# Patient Record
Sex: FEMALE | Race: WHITE | NOT HISPANIC OR LATINO | Employment: OTHER | URBAN - NONMETROPOLITAN AREA
[De-identification: names, ages, dates, MRNs, and addresses within clinical notes are randomized per-mention and may not be internally consistent; named-entity substitution may affect disease eponyms.]

---

## 2017-01-12 RX ORDER — LEVOTHYROXINE SODIUM 0.07 MG/1
TABLET ORAL
Qty: 30 TABLET | Refills: 4 | Status: SHIPPED | OUTPATIENT
Start: 2017-01-12 | End: 2017-05-26 | Stop reason: SDUPTHER

## 2017-01-12 RX ORDER — ATORVASTATIN CALCIUM 10 MG/1
TABLET, FILM COATED ORAL
Qty: 90 TABLET | Refills: 0 | Status: SHIPPED | OUTPATIENT
Start: 2017-01-12 | End: 2017-05-26 | Stop reason: SDUPTHER

## 2017-01-12 RX ORDER — MEGESTROL ACETATE 40 MG/1
TABLET ORAL
Qty: 30 TABLET | Refills: 5 | Status: SHIPPED | OUTPATIENT
Start: 2017-01-12 | End: 2017-05-26

## 2017-01-12 RX ORDER — LISINOPRIL AND HYDROCHLOROTHIAZIDE 25; 20 MG/1; MG/1
TABLET ORAL
Qty: 90 TABLET | Refills: 0 | Status: SHIPPED | OUTPATIENT
Start: 2017-01-12 | End: 2017-05-26 | Stop reason: SDUPTHER

## 2017-01-26 ENCOUNTER — TRANSCRIBE ORDERS (OUTPATIENT)
Dept: GENERAL RADIOLOGY | Facility: CLINIC | Age: 52
End: 2017-01-26

## 2017-01-26 DIAGNOSIS — M79.672 LEFT FOOT PAIN: ICD-10-CM

## 2017-01-26 DIAGNOSIS — M25.572 LEFT ANKLE PAIN, UNSPECIFIED CHRONICITY: ICD-10-CM

## 2017-01-26 DIAGNOSIS — M25.562 LEFT KNEE PAIN, UNSPECIFIED CHRONICITY: ICD-10-CM

## 2017-01-26 DIAGNOSIS — M25.561 RIGHT KNEE PAIN, UNSPECIFIED CHRONICITY: Primary | ICD-10-CM

## 2017-05-26 ENCOUNTER — OFFICE VISIT (OUTPATIENT)
Dept: FAMILY MEDICINE CLINIC | Facility: CLINIC | Age: 52
End: 2017-05-26

## 2017-05-26 VITALS
OXYGEN SATURATION: 100 % | TEMPERATURE: 98.6 F | HEIGHT: 66 IN | HEART RATE: 89 BPM | SYSTOLIC BLOOD PRESSURE: 136 MMHG | DIASTOLIC BLOOD PRESSURE: 74 MMHG | BODY MASS INDEX: 47.09 KG/M2 | WEIGHT: 293 LBS

## 2017-05-26 DIAGNOSIS — E78.5 HYPERLIPIDEMIA, UNSPECIFIED: Chronic | ICD-10-CM

## 2017-05-26 DIAGNOSIS — I10 ESSENTIAL HYPERTENSION: Chronic | ICD-10-CM

## 2017-05-26 DIAGNOSIS — F32.A DEPRESSION, UNSPECIFIED DEPRESSION TYPE: ICD-10-CM

## 2017-05-26 DIAGNOSIS — Z13.9 SCREENING: Primary | ICD-10-CM

## 2017-05-26 DIAGNOSIS — E03.9 HYPOTHYROIDISM, UNSPECIFIED: Chronic | ICD-10-CM

## 2017-05-26 DIAGNOSIS — E66.01 MORBID OBESITY DUE TO EXCESS CALORIES (HCC): Chronic | ICD-10-CM

## 2017-05-26 PROCEDURE — 99214 OFFICE O/P EST MOD 30 MIN: CPT | Performed by: INTERNAL MEDICINE

## 2017-05-26 RX ORDER — MEGESTROL ACETATE 40 MG/1
40 TABLET ORAL DAILY
COMMUNITY
End: 2017-05-26

## 2017-05-26 RX ORDER — ATORVASTATIN CALCIUM 10 MG/1
10 TABLET, FILM COATED ORAL DAILY
Qty: 90 TABLET | Refills: 1 | Status: SHIPPED | OUTPATIENT
Start: 2017-05-26 | End: 2017-12-21 | Stop reason: SDUPTHER

## 2017-05-26 RX ORDER — LISINOPRIL AND HYDROCHLOROTHIAZIDE 25; 20 MG/1; MG/1
TABLET ORAL
Qty: 90 TABLET | Refills: 1 | Status: SHIPPED | OUTPATIENT
Start: 2017-05-26 | End: 2017-12-21 | Stop reason: SDUPTHER

## 2017-05-26 RX ORDER — OXYCODONE HCL 10 MG/1
10 TABLET, FILM COATED, EXTENDED RELEASE ORAL 3 TIMES DAILY PRN
COMMUNITY
End: 2017-11-20 | Stop reason: SDUPTHER

## 2017-05-26 RX ORDER — FUROSEMIDE 20 MG/1
20 TABLET ORAL DAILY
Qty: 30 TABLET | Refills: 5 | Status: SHIPPED | OUTPATIENT
Start: 2017-05-26 | End: 2017-11-20

## 2017-05-26 RX ORDER — DULOXETIN HYDROCHLORIDE 30 MG/1
30 CAPSULE, DELAYED RELEASE ORAL DAILY
Qty: 30 CAPSULE | Refills: 5 | Status: SHIPPED | OUTPATIENT
Start: 2017-05-26 | End: 2017-11-20

## 2017-05-26 RX ORDER — METHADONE HYDROCHLORIDE 10 MG/1
10 TABLET ORAL EVERY 6 HOURS PRN
COMMUNITY
End: 2017-11-20

## 2017-05-26 RX ORDER — LEVOTHYROXINE SODIUM 0.07 MG/1
75 TABLET ORAL DAILY
Qty: 90 TABLET | Refills: 5 | Status: SHIPPED | OUTPATIENT
Start: 2017-05-26 | End: 2017-12-21 | Stop reason: SDUPTHER

## 2017-07-24 ENCOUNTER — TRANSCRIBE ORDERS (OUTPATIENT)
Dept: LAB | Facility: OTHER | Age: 52
End: 2017-07-24

## 2017-07-24 ENCOUNTER — TRANSCRIBE ORDERS (OUTPATIENT)
Dept: GENERAL RADIOLOGY | Facility: CLINIC | Age: 52
End: 2017-07-24

## 2017-07-24 ENCOUNTER — TRANSCRIBE ORDERS (OUTPATIENT)
Dept: CARDIOLOGY | Facility: CLINIC | Age: 52
End: 2017-07-24

## 2017-07-24 DIAGNOSIS — Z79.891 LONG TERM CURRENT USE OF OPIATE ANALGESIC: Primary | ICD-10-CM

## 2017-07-24 DIAGNOSIS — G89.29 OTHER CHRONIC PAIN: Primary | ICD-10-CM

## 2017-07-24 DIAGNOSIS — Z79.891 LONG TERM CURRENT USE OF METHADONE FOR PAIN CONTROL: Primary | ICD-10-CM

## 2017-07-24 PROCEDURE — 93005 ELECTROCARDIOGRAM TRACING: CPT | Performed by: INTERNAL MEDICINE

## 2017-09-08 RX ORDER — MEGESTROL ACETATE 40 MG/1
TABLET ORAL
Qty: 30 TABLET | Refills: 6 | Status: SHIPPED | OUTPATIENT
Start: 2017-09-08 | End: 2017-10-27 | Stop reason: SDUPTHER

## 2017-09-21 ENCOUNTER — LAB (OUTPATIENT)
Dept: LAB | Facility: OTHER | Age: 52
End: 2017-09-21

## 2017-09-21 DIAGNOSIS — Z13.9 SCREENING: ICD-10-CM

## 2017-09-21 LAB
ALBUMIN SERPL-MCNC: 3.8 G/DL (ref 3.2–5.5)
ALBUMIN/GLOB SERPL: 1.1 G/DL (ref 1–3)
ALP SERPL-CCNC: 80 U/L (ref 15–121)
ALT SERPL W P-5'-P-CCNC: 23 U/L (ref 10–60)
ANION GAP SERPL CALCULATED.3IONS-SCNC: 12 MMOL/L (ref 5–15)
AST SERPL-CCNC: 23 U/L (ref 10–60)
BASOPHILS # BLD AUTO: 0.03 10*3/MM3 (ref 0–0.2)
BASOPHILS NFR BLD AUTO: 0.2 % (ref 0–2)
BILIRUB SERPL-MCNC: 0.6 MG/DL (ref 0.2–1)
BUN BLD-MCNC: 18 MG/DL (ref 8–25)
BUN/CREAT SERPL: 25.7 (ref 7–25)
CALCIUM SPEC-SCNC: 9.3 MG/DL (ref 8.4–10.8)
CHLORIDE SERPL-SCNC: 104 MMOL/L (ref 100–112)
CHOLEST SERPL-MCNC: 159 MG/DL (ref 150–200)
CO2 SERPL-SCNC: 24 MMOL/L (ref 20–32)
CREAT BLD-MCNC: 0.7 MG/DL (ref 0.4–1.3)
DEPRECATED RDW RBC AUTO: 46.8 FL (ref 36.4–46.3)
EOSINOPHIL # BLD AUTO: 0.22 10*3/MM3 (ref 0–0.7)
EOSINOPHIL NFR BLD AUTO: 1.7 % (ref 0–7)
ERYTHROCYTE [DISTWIDTH] IN BLOOD BY AUTOMATED COUNT: 13.2 % (ref 11.5–14.5)
GFR SERPL CREATININE-BSD FRML MDRD: 88 ML/MIN/1.73 (ref 51–120)
GLOBULIN UR ELPH-MCNC: 3.6 GM/DL (ref 2.5–4.6)
GLUCOSE BLD-MCNC: 110 MG/DL (ref 70–100)
HCT VFR BLD AUTO: 42.5 % (ref 35–45)
HDLC SERPL-MCNC: 37 MG/DL (ref 35–100)
HGB BLD-MCNC: 13.6 G/DL (ref 12–15.5)
LDLC SERPL CALC-MCNC: 110 MG/DL
LDLC/HDLC SERPL: 2.98 {RATIO}
LYMPHOCYTES # BLD AUTO: 3.16 10*3/MM3 (ref 0.6–4.2)
LYMPHOCYTES NFR BLD AUTO: 24.2 % (ref 10–50)
MCH RBC QN AUTO: 31.5 PG (ref 26.5–34)
MCHC RBC AUTO-ENTMCNC: 32 G/DL (ref 31.4–36)
MCV RBC AUTO: 98.4 FL (ref 80–98)
MONOCYTES # BLD AUTO: 1.04 10*3/MM3 (ref 0–0.9)
MONOCYTES NFR BLD AUTO: 8 % (ref 0–12)
NEUTROPHILS # BLD AUTO: 8.59 10*3/MM3 (ref 2–8.6)
NEUTROPHILS NFR BLD AUTO: 65.9 % (ref 37–80)
PLATELET # BLD AUTO: 296 10*3/MM3 (ref 150–450)
PMV BLD AUTO: 12.5 FL (ref 8–12)
POTASSIUM BLD-SCNC: 4.4 MMOL/L (ref 3.4–5.4)
PROT SERPL-MCNC: 7.4 G/DL (ref 6.7–8.2)
RBC # BLD AUTO: 4.32 10*6/MM3 (ref 3.77–5.16)
SODIUM BLD-SCNC: 140 MMOL/L (ref 134–146)
T4 SERPL-MCNC: 10 MCG/DL (ref 5.5–11)
TRIGL SERPL-MCNC: 58 MG/DL (ref 35–160)
TSH SERPL DL<=0.05 MIU/L-ACNC: 3.36 MIU/ML (ref 0.46–4.68)
VLDLC SERPL-MCNC: 11.6 MG/DL
WBC NRBC COR # BLD: 13.04 10*3/MM3 (ref 3.2–9.8)

## 2017-09-21 PROCEDURE — 80061 LIPID PANEL: CPT | Performed by: INTERNAL MEDICINE

## 2017-09-21 PROCEDURE — 36415 COLL VENOUS BLD VENIPUNCTURE: CPT | Performed by: INTERNAL MEDICINE

## 2017-09-21 PROCEDURE — 80053 COMPREHEN METABOLIC PANEL: CPT | Performed by: INTERNAL MEDICINE

## 2017-09-21 PROCEDURE — 84436 ASSAY OF TOTAL THYROXINE: CPT | Performed by: INTERNAL MEDICINE

## 2017-09-21 PROCEDURE — 85025 COMPLETE CBC W/AUTO DIFF WBC: CPT | Performed by: INTERNAL MEDICINE

## 2017-09-21 PROCEDURE — 84443 ASSAY THYROID STIM HORMONE: CPT | Performed by: INTERNAL MEDICINE

## 2017-09-25 ENCOUNTER — TELEPHONE (OUTPATIENT)
Dept: FAMILY MEDICINE CLINIC | Facility: CLINIC | Age: 52
End: 2017-09-25

## 2017-09-25 NOTE — TELEPHONE ENCOUNTER
----- Message from Bhargav Choudhary MD sent at 9/21/2017  2:43 PM CDT -----  Bmp and a1c and cbc 6 weeks.

## 2017-09-26 ENCOUNTER — TELEPHONE (OUTPATIENT)
Dept: FAMILY MEDICINE CLINIC | Facility: CLINIC | Age: 52
End: 2017-09-26

## 2017-09-26 DIAGNOSIS — R73.09 ELEVATED GLUCOSE LEVEL: Primary | ICD-10-CM

## 2017-09-26 NOTE — TELEPHONE ENCOUNTER
Call placed to patient regarding lab results and mds orders of bmp,a1c, and cbc in 6 weeks patient voiced understanding and orders put in place at this time.

## 2017-10-10 DIAGNOSIS — Z79.899 LONG TERM USE OF DRUG: Primary | ICD-10-CM

## 2017-10-27 RX ORDER — MEGESTROL ACETATE 40 MG/1
40 TABLET ORAL DAILY
Qty: 30 TABLET | Refills: 5 | Status: SHIPPED | OUTPATIENT
Start: 2017-10-27 | End: 2018-01-29 | Stop reason: SDUPTHER

## 2017-11-20 ENCOUNTER — OFFICE VISIT (OUTPATIENT)
Dept: FAMILY MEDICINE CLINIC | Facility: CLINIC | Age: 52
End: 2017-11-20

## 2017-11-20 VITALS
HEIGHT: 66 IN | DIASTOLIC BLOOD PRESSURE: 80 MMHG | OXYGEN SATURATION: 98 % | BODY MASS INDEX: 45 KG/M2 | WEIGHT: 280 LBS | SYSTOLIC BLOOD PRESSURE: 154 MMHG | HEART RATE: 90 BPM

## 2017-11-20 DIAGNOSIS — E66.01 MORBID OBESITY DUE TO EXCESS CALORIES (HCC): Chronic | ICD-10-CM

## 2017-11-20 DIAGNOSIS — J44.9 CHRONIC OBSTRUCTIVE PULMONARY DISEASE, UNSPECIFIED COPD TYPE (HCC): Chronic | ICD-10-CM

## 2017-11-20 DIAGNOSIS — E03.9 ACQUIRED HYPOTHYROIDISM: Chronic | ICD-10-CM

## 2017-11-20 DIAGNOSIS — M15.9 OSTEOARTHRITIS OF MULTIPLE JOINTS, UNSPECIFIED OSTEOARTHRITIS TYPE: Primary | Chronic | ICD-10-CM

## 2017-11-20 DIAGNOSIS — E78.01 FAMILIAL HYPERCHOLESTEROLEMIA: Chronic | ICD-10-CM

## 2017-11-20 PROCEDURE — 99214 OFFICE O/P EST MOD 30 MIN: CPT | Performed by: INTERNAL MEDICINE

## 2017-11-20 RX ORDER — GABAPENTIN 800 MG/1
800 TABLET ORAL 3 TIMES DAILY
COMMUNITY
End: 2018-04-30

## 2017-11-20 RX ORDER — OXYCODONE HCL 10 MG/1
10 TABLET, FILM COATED, EXTENDED RELEASE ORAL 3 TIMES DAILY PRN
Qty: 90 TABLET | Refills: 0 | Status: SHIPPED | OUTPATIENT
Start: 2017-11-20 | End: 2017-11-27

## 2017-11-27 RX ORDER — OXYCODONE HYDROCHLORIDE 10 MG/1
10 TABLET ORAL EVERY 6 HOURS PRN
Qty: 90 TABLET | Refills: 0 | Status: CANCELLED | OUTPATIENT
Start: 2017-11-27

## 2017-11-27 RX ORDER — OXYCODONE HYDROCHLORIDE 10 MG/1
10 TABLET ORAL EVERY 8 HOURS PRN
Qty: 90 TABLET | Refills: 0 | Status: SHIPPED | OUTPATIENT
Start: 2017-11-27 | End: 2018-01-04 | Stop reason: SDUPTHER

## 2017-12-21 RX ORDER — LEVOTHYROXINE SODIUM 0.07 MG/1
75 TABLET ORAL DAILY
Qty: 90 TABLET | Refills: 1 | Status: SHIPPED | OUTPATIENT
Start: 2017-12-21 | End: 2018-01-29 | Stop reason: SDUPTHER

## 2017-12-21 RX ORDER — LISINOPRIL AND HYDROCHLOROTHIAZIDE 25; 20 MG/1; MG/1
1 TABLET ORAL DAILY
Qty: 90 TABLET | Refills: 1 | Status: SHIPPED | OUTPATIENT
Start: 2017-12-21 | End: 2018-01-29 | Stop reason: SDUPTHER

## 2017-12-21 RX ORDER — ATORVASTATIN CALCIUM 10 MG/1
10 TABLET, FILM COATED ORAL DAILY
Qty: 90 TABLET | Refills: 1 | Status: SHIPPED | OUTPATIENT
Start: 2017-12-21 | End: 2018-01-29 | Stop reason: SDUPTHER

## 2018-01-04 ENCOUNTER — OFFICE VISIT (OUTPATIENT)
Dept: FAMILY MEDICINE CLINIC | Facility: CLINIC | Age: 53
End: 2018-01-04

## 2018-01-04 ENCOUNTER — LAB (OUTPATIENT)
Dept: LAB | Facility: OTHER | Age: 53
End: 2018-01-04

## 2018-01-04 VITALS
OXYGEN SATURATION: 100 % | HEIGHT: 66 IN | HEART RATE: 77 BPM | WEIGHT: 280 LBS | BODY MASS INDEX: 45 KG/M2 | SYSTOLIC BLOOD PRESSURE: 132 MMHG | DIASTOLIC BLOOD PRESSURE: 70 MMHG

## 2018-01-04 DIAGNOSIS — Z79.899 LONG TERM USE OF DRUG: ICD-10-CM

## 2018-01-04 DIAGNOSIS — M15.9 PRIMARY OSTEOARTHRITIS INVOLVING MULTIPLE JOINTS: Primary | Chronic | ICD-10-CM

## 2018-01-04 PROCEDURE — 80307 DRUG TEST PRSMV CHEM ANLYZR: CPT | Performed by: INTERNAL MEDICINE

## 2018-01-04 PROCEDURE — 99214 OFFICE O/P EST MOD 30 MIN: CPT | Performed by: INTERNAL MEDICINE

## 2018-01-04 RX ORDER — OXYCODONE HYDROCHLORIDE 10 MG/1
10 TABLET ORAL EVERY 8 HOURS PRN
Qty: 90 TABLET | Refills: 0 | Status: SHIPPED | OUTPATIENT
Start: 2018-01-04 | End: 2018-02-01 | Stop reason: SDUPTHER

## 2018-01-04 NOTE — PROGRESS NOTES
Subjective   Stella Rodriguez is a 52 y.o. female.     Chief Complaint   Patient presents with   • Med Refill        History of Present Illness     Pt in f/u on DJD, HTN, hypothyroidism, neuropathy, and hyperlipidemia.     Pt says she is needing refills.  She thinks she is doing okay and doesn't have any new problems, and says she is tolerating her medications and conditions just fine.      The following portions of the patient's history were reviewed and updated as appropriate: allergies, current medications, past family history, past medical history, past social history, past surgical history and problem list.    Review of Systems   Constitutional: Negative for activity change, appetite change, fatigue and unexpected weight change.   HENT: Negative for ear pain, facial swelling and hearing loss.    Eyes: Negative for discharge and visual disturbance.   Respiratory: Negative for cough, chest tightness, shortness of breath and wheezing.    Cardiovascular: Negative for chest pain, palpitations and leg swelling.   Gastrointestinal: Negative for abdominal pain.   Genitourinary: Negative for difficulty urinating, dysuria, flank pain, frequency, hematuria and urgency.   Musculoskeletal: Positive for arthralgias and back pain. Negative for joint swelling and myalgias.   Skin: Negative for color change and rash.   Allergic/Immunologic: Negative for food allergies.   Neurological: Negative for dizziness, syncope, weakness, numbness and headaches.   Hematological: Negative for adenopathy.   Psychiatric/Behavioral: Negative for confusion, decreased concentration, dysphoric mood, hallucinations, self-injury, sleep disturbance and suicidal ideas. The patient is not nervous/anxious.    All other systems reviewed and are negative.      Objective   Physical Exam   Constitutional: She is oriented to person, place, and time. Vital signs are normal. She appears well-developed and well-nourished.   HENT:   Head: Normocephalic.   Right  Ear: External ear normal.   Left Ear: External ear normal.   Nose: Nose normal.   Mouth/Throat: Oropharynx is clear and moist.   Eyes: Conjunctivae and EOM are normal. Pupils are equal, round, and reactive to light.   Neck: Normal range of motion. Neck supple. No JVD present. No thyromegaly present.   Cardiovascular: Normal rate, regular rhythm, normal heart sounds and intact distal pulses.    No murmur heard.  Pulmonary/Chest: Effort normal and breath sounds normal. No respiratory distress. She has no wheezes. She has no rales. She exhibits no tenderness.   Abdominal: Soft. Bowel sounds are normal. She exhibits no distension and no mass. There is no tenderness. There is no rebound and no guarding. No hernia.   Musculoskeletal: Normal range of motion. She exhibits tenderness. She exhibits no edema or deformity.   Lymphadenopathy:     She has no cervical adenopathy.   Neurological: She is alert and oriented to person, place, and time. No cranial nerve deficit. Coordination normal.   Skin: Skin is warm and dry. No rash noted. No pallor.   Psychiatric: She has a normal mood and affect. Her behavior is normal. Judgment and thought content normal. Her mood appears not anxious. She is not agitated and not aggressive. Thought content is not paranoid and not delusional. Cognition and memory are normal. She does not exhibit a depressed mood. She expresses no homicidal and no suicidal ideation. She expresses no suicidal plans and no homicidal plans.   Nursing note and vitals reviewed.      Assessment/Plan   Stella was seen today for med refill.    Diagnoses and all orders for this visit:    Primary osteoarthritis involving multiple joints    Other orders  -     oxyCODONE (ROXICODONE) 10 MG tablet; Take 1 tablet by mouth Every 8 (Eight) Hours As Needed for Moderate Pain .        Referral for pain management is in place at this time, no appointment is seen.     Refill medications if due    UDS is over due.     Scribed for   [facility] by Fel[facility] Noel [facility]/18  Physician voiced contents of this note to Scribe in order for this note to be finished.

## 2018-01-06 LAB
AMPHETAMINES UR QL SCN: NEGATIVE NG/ML
BARBITURATES UR QL SCN: NEGATIVE NG/ML
BENZODIAZ UR QL SCN: NEGATIVE NG/ML
BZE UR QL SCN: NEGATIVE NG/ML
CANNABINOIDS UR QL SCN: NEGATIVE NG/ML
CREAT 24H UR-MCNC: 40.1 MG/DL (ref 20–300)
FENTANYL+NORFENTANYL UR QL SCN: NEGATIVE PG/ML
Lab: NORMAL
MEPERIDINE UR CFM-MCNC: NEGATIVE NG/ML
METHADONE UR QL SCN: NEGATIVE NG/ML
OPIATES TESTED UR SCN: NEGATIVE NG/ML
OXYCODONE/OXYMORPHONE, URINE: NEGATIVE NG/ML
PCP UR QL: NEGATIVE NG/ML
PH UR STRIP.AUTO: 6.1 [PH] (ref 4.5–8.9)
PROPOXYPH UR QL SCN: NEGATIVE NG/ML
SP GR UR: 1.01
TRAMADOL UR QL SCN: NEGATIVE NG/ML

## 2018-01-29 ENCOUNTER — OFFICE VISIT (OUTPATIENT)
Dept: FAMILY MEDICINE CLINIC | Facility: CLINIC | Age: 53
End: 2018-01-29

## 2018-01-29 ENCOUNTER — TELEPHONE (OUTPATIENT)
Dept: FAMILY MEDICINE CLINIC | Facility: CLINIC | Age: 53
End: 2018-01-29

## 2018-01-29 VITALS
OXYGEN SATURATION: 99 % | HEART RATE: 85 BPM | WEIGHT: 280 LBS | BODY MASS INDEX: 45 KG/M2 | HEIGHT: 66 IN | SYSTOLIC BLOOD PRESSURE: 128 MMHG | DIASTOLIC BLOOD PRESSURE: 70 MMHG

## 2018-01-29 DIAGNOSIS — H10.31 ACUTE CONJUNCTIVITIS OF RIGHT EYE, UNSPECIFIED ACUTE CONJUNCTIVITIS TYPE: Primary | ICD-10-CM

## 2018-01-29 DIAGNOSIS — M17.0 PRIMARY OSTEOARTHRITIS OF BOTH KNEES: Chronic | ICD-10-CM

## 2018-01-29 PROCEDURE — 99213 OFFICE O/P EST LOW 20 MIN: CPT | Performed by: INTERNAL MEDICINE

## 2018-01-29 RX ORDER — ATORVASTATIN CALCIUM 10 MG/1
10 TABLET, FILM COATED ORAL DAILY
Qty: 90 TABLET | Refills: 1 | Status: SHIPPED | OUTPATIENT
Start: 2018-01-29 | End: 2018-04-30 | Stop reason: SDUPTHER

## 2018-01-29 RX ORDER — LEVOTHYROXINE SODIUM 0.07 MG/1
75 TABLET ORAL DAILY
Qty: 90 TABLET | Refills: 1 | Status: SHIPPED | OUTPATIENT
Start: 2018-01-29 | End: 2018-04-30 | Stop reason: SDUPTHER

## 2018-01-29 RX ORDER — LISINOPRIL AND HYDROCHLOROTHIAZIDE 25; 20 MG/1; MG/1
1 TABLET ORAL DAILY
Qty: 90 TABLET | Refills: 1 | Status: SHIPPED | OUTPATIENT
Start: 2018-01-29 | End: 2018-04-30 | Stop reason: SDUPTHER

## 2018-01-29 RX ORDER — MEGESTROL ACETATE 40 MG/1
40 TABLET ORAL DAILY
Qty: 30 TABLET | Refills: 5 | Status: SHIPPED | OUTPATIENT
Start: 2018-01-29 | End: 2018-04-30

## 2018-01-29 NOTE — TELEPHONE ENCOUNTER
Call placed to patient regarding mri appointments at [facility] on Monday 02/05/18 @2:00PM for left knee and Wednesday 02/07/18 @ 11:00AM for right knee d/t medicare guide lines can only have 1 radiology appointment per day. Unable to reach patient left VM instructing patient to return call for appointment date and times.

## 2018-02-01 RX ORDER — OXYCODONE HYDROCHLORIDE 10 MG/1
10 TABLET ORAL EVERY 8 HOURS PRN
Qty: 90 TABLET | Refills: 0 | Status: SHIPPED | OUTPATIENT
Start: 2018-02-01 | End: 2018-03-01 | Stop reason: SDUPTHER

## 2018-02-26 ENCOUNTER — OFFICE VISIT (OUTPATIENT)
Dept: FAMILY MEDICINE CLINIC | Facility: CLINIC | Age: 53
End: 2018-02-26

## 2018-02-26 VITALS
OXYGEN SATURATION: 99 % | TEMPERATURE: 97.6 F | BODY MASS INDEX: 48.82 KG/M2 | HEART RATE: 80 BPM | SYSTOLIC BLOOD PRESSURE: 136 MMHG | WEIGHT: 293 LBS | HEIGHT: 65 IN | DIASTOLIC BLOOD PRESSURE: 76 MMHG

## 2018-02-26 DIAGNOSIS — E03.9 ACQUIRED HYPOTHYROIDISM: Chronic | ICD-10-CM

## 2018-02-26 DIAGNOSIS — M25.562 PAIN IN BOTH KNEES, UNSPECIFIED CHRONICITY: Primary | ICD-10-CM

## 2018-02-26 DIAGNOSIS — M25.561 PAIN IN BOTH KNEES, UNSPECIFIED CHRONICITY: Primary | ICD-10-CM

## 2018-02-26 DIAGNOSIS — IMO0001 CLASS 3 OBESITY DUE TO EXCESS CALORIES WITH SERIOUS COMORBIDITY AND BODY MASS INDEX (BMI) OF 45.0 TO 49.9 IN ADULT: Chronic | ICD-10-CM

## 2018-02-26 DIAGNOSIS — I10 ESSENTIAL HYPERTENSION: Chronic | ICD-10-CM

## 2018-02-26 DIAGNOSIS — Z01.818 PREOPERATIVE CLEARANCE: ICD-10-CM

## 2018-02-26 PROCEDURE — 99214 OFFICE O/P EST MOD 30 MIN: CPT | Performed by: INTERNAL MEDICINE

## 2018-03-01 RX ORDER — OXYCODONE HYDROCHLORIDE 10 MG/1
10 TABLET ORAL EVERY 8 HOURS PRN
Qty: 90 TABLET | Refills: 0 | Status: SHIPPED | OUTPATIENT
Start: 2018-03-01 | End: 2018-03-28 | Stop reason: SDUPTHER

## 2018-03-13 DIAGNOSIS — G89.29 OTHER CHRONIC PAIN: Primary | ICD-10-CM

## 2018-03-19 DIAGNOSIS — M15.9 OSTEOARTHRITIS OF MULTIPLE JOINTS, UNSPECIFIED OSTEOARTHRITIS TYPE: Primary | ICD-10-CM

## 2018-03-29 RX ORDER — OXYCODONE HYDROCHLORIDE 10 MG/1
10 TABLET ORAL EVERY 8 HOURS PRN
Qty: 90 TABLET | Refills: 0 | Status: SHIPPED | OUTPATIENT
Start: 2018-03-29

## 2018-04-19 DIAGNOSIS — M15.9 OSTEOARTHRITIS OF MULTIPLE JOINTS, UNSPECIFIED OSTEOARTHRITIS TYPE: Primary | ICD-10-CM

## 2018-04-25 ENCOUNTER — TELEPHONE (OUTPATIENT)
Dept: FAMILY MEDICINE CLINIC | Facility: CLINIC | Age: 53
End: 2018-04-25

## 2018-04-25 NOTE — TELEPHONE ENCOUNTER
I called the patient and instructed her to call [facility] and schedule an appt. They are well equipped to determine her pain med needs.   The patient understood that the last refill we provided her was the last one from Dr Choudhary. The patient voiced understanding and thanked me for the call. She said she would call [facility]. I gave the patient the providers phone number.

## 2018-04-25 NOTE — TELEPHONE ENCOUNTER
The patient called asking for a refill on her pain meds.  A referral has been processed for [facility] and they have not been able to reach the patient.

## 2018-04-30 ENCOUNTER — OFFICE VISIT (OUTPATIENT)
Dept: FAMILY MEDICINE CLINIC | Facility: CLINIC | Age: 53
End: 2018-04-30

## 2018-04-30 ENCOUNTER — DOCUMENTATION (OUTPATIENT)
Dept: FAMILY MEDICINE CLINIC | Facility: CLINIC | Age: 53
End: 2018-04-30

## 2018-04-30 VITALS
BODY MASS INDEX: 47.09 KG/M2 | OXYGEN SATURATION: 98 % | SYSTOLIC BLOOD PRESSURE: 138 MMHG | WEIGHT: 293 LBS | TEMPERATURE: 97.6 F | DIASTOLIC BLOOD PRESSURE: 74 MMHG | HEIGHT: 66 IN | HEART RATE: 94 BPM

## 2018-04-30 DIAGNOSIS — F17.200 TOBACCO DEPENDENCE: ICD-10-CM

## 2018-04-30 DIAGNOSIS — Z12.31 ENCOUNTER FOR SCREENING MAMMOGRAM FOR BREAST CANCER: ICD-10-CM

## 2018-04-30 DIAGNOSIS — J44.9 CHRONIC OBSTRUCTIVE PULMONARY DISEASE, UNSPECIFIED COPD TYPE (HCC): ICD-10-CM

## 2018-04-30 DIAGNOSIS — E66.01 MORBID OBESITY, UNSPECIFIED OBESITY TYPE (HCC): ICD-10-CM

## 2018-04-30 DIAGNOSIS — Z12.4 SCREENING FOR CERVICAL CANCER: ICD-10-CM

## 2018-04-30 DIAGNOSIS — F32.A DEPRESSION, UNSPECIFIED DEPRESSION TYPE: ICD-10-CM

## 2018-04-30 DIAGNOSIS — I10 ESSENTIAL HYPERTENSION: Primary | ICD-10-CM

## 2018-04-30 PROCEDURE — 99213 OFFICE O/P EST LOW 20 MIN: CPT | Performed by: NURSE PRACTITIONER

## 2018-04-30 RX ORDER — LEVOTHYROXINE SODIUM 0.07 MG/1
75 TABLET ORAL DAILY
Qty: 90 TABLET | Refills: 1 | Status: SHIPPED | OUTPATIENT
Start: 2018-04-30

## 2018-04-30 RX ORDER — ATORVASTATIN CALCIUM 10 MG/1
10 TABLET, FILM COATED ORAL DAILY
Qty: 90 TABLET | Refills: 1 | Status: SHIPPED | OUTPATIENT
Start: 2018-04-30

## 2018-04-30 RX ORDER — LISINOPRIL AND HYDROCHLOROTHIAZIDE 25; 20 MG/1; MG/1
1 TABLET ORAL DAILY
Qty: 90 TABLET | Refills: 1 | Status: SHIPPED | OUTPATIENT
Start: 2018-04-30

## 2018-04-30 RX ORDER — ALBUTEROL SULFATE 2.5 MG/3ML
2.5 SOLUTION RESPIRATORY (INHALATION) EVERY 4 HOURS PRN
Qty: 75 VIAL | Refills: 2 | Status: SHIPPED | OUTPATIENT
Start: 2018-04-30

## 2018-04-30 NOTE — PROGRESS NOTES
Today the patient came to Referrals to speak with me. She was upset and wanted to talk about her pain management medications.   The patient stated she had just seen MURPHY Blackman. Norco was offered to help with the pain in her knees however the patient advised MURPHY Tolbert that Norco was not effective for her.    She offered to write Ultram for the patient. The patient agreed she had not tried Ultram before however she refused the script for pain management. The patient left Tomasz's office without any of the help that was offered to help her.   The patient came to me for suggestions. Pee I assisted her in getting her appt with [facility] to take over her pain management medications. The appt is not until 6-22-18 so the goal was to provide some form of pain management until she could get to her appt.   I explained to the patient that she needed to consider other forms of pain management until her appt in June. If she does not feel satisfied with what is offered then she will have to find a Provider willing/able to write Oxycodone. I suggested  [facility] in [place]. I offered to get the script offered for Ultram as recommended by Tomasz's office and the patient denied the offer. The patient only wanted to continue her current therapy of Oxycodone.   I advised the patient to call [facility] and offer to come in earlier than June 22nd if they had an opening sooner due to a no show or cancellation. The patient said she would call them.  The patient stated she was seeing Dr Mckinney for her knee pain and when I asked her if he would write the Oxycodone she replied no. Dr Mckinney noted the patient has OA in both knees. He suggested a knee replacement after some weight loss and referred the patient to [facility].   Again I suggested the patient consider other options for pain treatment. The patient refused and left the office.

## 2018-04-30 NOTE — PROGRESS NOTES
Subjective   Stella Rodriguez is a 52 y.o. female.   Presents today to establish care.  Will need preventive measures set up.  Wants help to quit smoking.  Has been seeing Kenrick.  Is to be having a gastric sleeve procedure with [facility] at [place] in order to get her set up for knee replacement.  Has an appt with pain management on 6/22.  Have correspondence from Dr. 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      History of Present Illness     The following portions of the patient's history were reviewed and updated as appropriate: allergies, current medications, past family history, past medical history, past social history, past surgical history and problem list.    Review of Systems   Constitutional: Negative for chills, fatigue and fever.   HENT: Negative for congestion, sneezing, sore throat and trouble swallowing.    Eyes: Negative for visual disturbance.   Respiratory: Negative for cough, chest tightness, shortness of breath and wheezing.    Cardiovascular: Negative for chest pain, palpitations and leg swelling.   Gastrointestinal: Negative for abdominal pain, constipation, diarrhea, nausea and vomiting.   Genitourinary: Negative for dysuria, frequency and urgency.   Musculoskeletal: Negative for neck pain.        Chronic knee pain   Skin: Negative for rash.   Neurological: Negative for dizziness, weakness and headaches.   Psychiatric/Behavioral:        In the past two weeks the pt has not felt down, depressed, hopeless or lost interest in doing things   All other systems reviewed and are negative.      Objective   Physical Exam   Constitutional: She is oriented to person, place, and time. She appears well-developed and well-nourished. She is cooperative.  Non-toxic appearance. She does not have a sickly appearance.   Sitting in wheelchair   HENT:   Head:  Normocephalic and atraumatic.   Right Ear: External ear normal.   Left Ear: External ear normal.   Nose: Nose normal.   Mouth/Throat: Uvula is midline, oropharynx is clear and moist and mucous membranes are normal.   Eyes: Conjunctivae, EOM and lids are normal. Pupils are equal, round, and reactive to light. Right eye exhibits no discharge. Left eye exhibits no discharge. No scleral icterus.   Neck: Normal range of motion. Neck supple. No thyromegaly present.   Cardiovascular: Normal rate, regular rhythm, normal heart sounds and intact distal pulses.  Exam reveals no gallop and no friction rub.    No murmur heard.  Pulmonary/Chest: Effort normal and breath sounds normal. No respiratory distress. She has no wheezes. She has no rales.   Abdominal: Soft. Bowel sounds are normal. She exhibits no distension. There is no tenderness. There is no rebound and no guarding.   Musculoskeletal: Normal range of motion. She exhibits no edema.   Lymphadenopathy:     She has no cervical adenopathy.   Neurological: She is alert and oriented to person, place, and time. No cranial nerve deficit.   Skin: Skin is warm and dry. No rash noted.   Psychiatric: She has a normal mood and affect. Her speech is normal and behavior is normal. Judgment and thought content normal. Cognition and memory are normal.   Nursing note and vitals reviewed.      Assessment/Plan   Stella was seen today for establish care.    Diagnoses and all orders for this visit:    Essential hypertension  -     CBC & Differential; Future  -     Comprehensive Metabolic Panel; Future  -     Lipid Panel; Future  -     T4, Free; Future  -     TSH; Future  -     Urinalysis With / Culture If Indicated - Urine, Clean Catch; Future    Encounter for screening mammogram for breast cancer  -     Mammo Screening Bilateral With CAD; Future    Screening for cervical cancer  -     Ambulatory Referral to Gynecology    Chronic obstructive pulmonary disease, unspecified COPD  "type    Depression, unspecified depression type    Morbid obesity, unspecified obesity type    Tobacco dependence    Other orders  -     varenicline (CHANTIX STARTING MONTH PAK) 0.5 MG X 11 & 1 MG X 42 tablet; Take 0.5 mg one daily on days 1-3 and and 0.5 mg twice daily on days 4-7.Then 1 mg twice daily for a total of 12 weeks.  -     albuterol (PROVENTIL) (2.5 MG/3ML) 0.083% nebulizer solution; Take 2.5 mg by nebulization Every 4 (Four) Hours As Needed for Wheezing. 1 vial TID prn  -     atorvastatin (LIPITOR) 10 MG tablet; Take 1 tablet by mouth Daily.  -     levothyroxine (SYNTHROID, LEVOTHROID) 75 MCG tablet; Take 1 tablet by mouth Daily.  -     lisinopril-hydrochlorothiazide (PRINZIDE,ZESTORETIC) 20-25 MG per tablet; Take 1 tablet by mouth Daily. As directed    When I was about to end my visit with her she asked if I would be able to fill her pain medications.  Explained that I cannot write for Percocet but I would write for Lortab until her pain management.  She told me that Percocets are really the only thing that helps.  Then asked her if she had ever taken muscle relaxers and she had taken Robaxin and \"a pink pill.\"  Instructed that I would write for Ultram.  Reviewed HELEN# [id number] which shows polyprovider status.               "

## 2018-05-02 ENCOUNTER — OFFICE VISIT (OUTPATIENT)
Dept: FAMILY MEDICINE CLINIC | Facility: CLINIC | Age: 53
End: 2018-05-02

## 2018-05-02 VITALS
BODY MASS INDEX: 47.09 KG/M2 | HEART RATE: 102 BPM | DIASTOLIC BLOOD PRESSURE: 82 MMHG | OXYGEN SATURATION: 99 % | WEIGHT: 293 LBS | HEIGHT: 66 IN | SYSTOLIC BLOOD PRESSURE: 120 MMHG

## 2018-05-02 DIAGNOSIS — Z12.11 SCREENING FOR COLON CANCER: ICD-10-CM

## 2018-05-02 DIAGNOSIS — I10 ESSENTIAL HYPERTENSION: Primary | ICD-10-CM

## 2018-05-02 DIAGNOSIS — J44.9 CHRONIC OBSTRUCTIVE PULMONARY DISEASE, UNSPECIFIED COPD TYPE (HCC): ICD-10-CM

## 2018-05-02 DIAGNOSIS — R73.9 HYPERGLYCEMIA: ICD-10-CM

## 2018-05-02 DIAGNOSIS — G89.4 CHRONIC PAIN SYNDROME: ICD-10-CM

## 2018-05-02 DIAGNOSIS — Z11.59 NEED FOR HEPATITIS C SCREENING TEST: ICD-10-CM

## 2018-05-02 DIAGNOSIS — F17.200 TOBACCO DEPENDENCE: ICD-10-CM

## 2018-05-02 PROCEDURE — 99406 BEHAV CHNG SMOKING 3-10 MIN: CPT | Performed by: FAMILY MEDICINE

## 2018-05-02 PROCEDURE — 99203 OFFICE O/P NEW LOW 30 MIN: CPT | Performed by: FAMILY MEDICINE

## 2018-05-05 PROBLEM — G89.4 CHRONIC PAIN SYNDROME: Status: ACTIVE | Noted: 2018-05-05

## 2018-05-05 PROBLEM — E03.9 HYPOTHYROID: Status: ACTIVE | Noted: 2018-05-05

## 2018-05-05 PROBLEM — E78.00 HYPERCHOLESTEROLEMIA: Status: ACTIVE | Noted: 2018-05-05

## 2018-05-05 PROBLEM — Z12.4 SCREENING FOR CERVICAL CANCER: Status: RESOLVED | Noted: 2018-04-30 | Resolved: 2018-05-05

## 2018-05-05 NOTE — PROGRESS NOTES
"  Subjective:     Chief Complaint:   Chief Complaint   Patient presents with   • Hypertension     Stella Rodriguez is a 52 y.o. female who presents for follow-up of hypertension and chronic pain, establish care.  She was formerly seeing Dr Choudhary until he retired.  Of note, she recently saw Bozena ARRINGTON on 4/30 to establish care.  The patient requested refill of the oxycodone that Dr Choudhary had been writing for her.  She had been previously given a referral to Pain Management here at [facility] (appt made for 6/22/18), and offered Tramadol prescription until then.  She adamantly refused, and even requested to speak to Referral (see 4/30 note Yoon Beaver MA), and I suspect that is why she made another appointment to establish care with me today.  She also sees Orthopedics at [place] (Dr Mckinney), for ostoarthritis of both knees; he has recommended replacement after weight loss, but also is unwilling to write the oxycodone.  Upon further chart review, it would appear that she was previously fired from pain management in [place] (Dr [facility]) due to overuse of methadone.  After reviewing all of the above, I informed the patient that we would not be writing opioids in accordance with our practice policy, and she needs to keep her appointment with Pain Management.  She then stated she would still like to establish care with me today, as she would \"prefer to see an MD.\"  However of note, towards the end of the encounter she once again asked for opioids.    She doesn't really seem to have any other concerns today.  Her blood pressure is controlled, and she denies urgency symptoms.  Ms Tolbert ordered labs, but she hasn't gone to the lab yet.    Past Medical Hx:  Past Medical History:   Diagnosis Date   • Allergic rhinitis    • COPD (chronic obstructive pulmonary disease)    • Degenerative joint disease involving multiple joints    • Discoloration of skin    • Edema of lower " extremity    • Essential hypertension    • Hyperlipidemia    • Morbid obesity due to excess calories    • Opioid dependence     on methadone      • Pain in unspecified hand    • Pain in unspecified knee    • Pitting edema 3+        Past Surgical Hx:  Past Surgical History:   Procedure Laterality Date   • NO PAST SURGERIES         Health Maintenance:  Health Maintenance   Topic Date Due   • HEPATITIS C SCREENING  08/29/2016   • PAP SMEAR  08/29/2016   • COLONOSCOPY  09/09/2016   • MAMMOGRAM  10/16/2016   • PNEUMOCOCCAL VACCINE (19-64 MEDIUM RISK) (1 of 1 - PPSV23) 07/01/2018 (Originally 11/11/1984)   • INFLUENZA VACCINE  08/01/2018   • LIPID PANEL  09/21/2018   • MEDICARE ANNUAL WELLNESS  11/20/2018   • TDAP/TD VACCINES (2 - Td) 01/01/2026       Current Meds:    Current Outpatient Prescriptions:   •  albuterol (PROVENTIL) (2.5 MG/3ML) 0.083% nebulizer solution, Take 2.5 mg by nebulization Every 4 (Four) Hours As Needed for Wheezing. 1 vial TID prn, Disp: 75 vial, Rfl: 2  •  atorvastatin (LIPITOR) 10 MG tablet, Take 1 tablet by mouth Daily., Disp: 90 tablet, Rfl: 1  •  levothyroxine (SYNTHROID, LEVOTHROID) 75 MCG tablet, Take 1 tablet by mouth Daily., Disp: 90 tablet, Rfl: 1  •  lisinopril-hydrochlorothiazide (PRINZIDE,ZESTORETIC) 20-25 MG per tablet, Take 1 tablet by mouth Daily. As directed, Disp: 90 tablet, Rfl: 1  •  Nebulizers (COMP AIR COMPRESSOR NEBULIZER) misc, Use as directed, Disp: , Rfl:   •  oxyCODONE (ROXICODONE) 10 MG tablet, Take 1 tablet by mouth Every 8 (Eight) Hours As Needed for Moderate Pain ., Disp: 90 tablet, Rfl: 0  •  varenicline (CHANTIX STARTING MONTH PAK) 0.5 MG X 11 & 1 MG X 42 tablet, Take 0.5 mg one daily on days 1-3 and and 0.5 mg twice daily on days 4-7.Then 1 mg twice daily for a total of 12 weeks., Disp: 42 tablet, Rfl: 0    Allergies:  Zocor [simvastatin]    Family Hx:  Family History   Problem Relation Age of Onset   • Diabetes Other    • Hyperlipidemia Other    • Hypertension Other  "   • Heart disease Other    • Hypertension Mother    • Hypertension Father    • Hypertension Maternal Aunt    • Thyroid disease Maternal Aunt         Social History:  Social History     Social History   • Marital status:      Spouse name: N/A   • Number of children: N/A   • Years of education: N/A     Occupational History   • DISABLED      patient states due to \"knees, carpal tunnel\"     Social History Main Topics   • Smoking status: Current Every Day Smoker     Packs/day: 1.00     Years: 36.00     Types: Cigarettes     Start date: 1981   • Smokeless tobacco: Never Used   • Alcohol use No   • Drug use: No   • Sexual activity: Defer     Other Topics Concern   • Not on file     Social History Narrative   • No narrative on file       Review of Systems  Review of Systems   Constitutional: Negative for chills and fever.   HENT: Negative for facial swelling and tinnitus.    Eyes: Negative for discharge and itching.   Respiratory: Negative for apnea and chest tightness.    Cardiovascular: Negative for chest pain and palpitations.   Gastrointestinal: Negative for abdominal distention and abdominal pain.   Endocrine: Negative for cold intolerance and heat intolerance.   Genitourinary: Negative for difficulty urinating and dysuria.   Musculoskeletal: Positive for arthralgias, back pain and joint swelling.   Skin: Negative for color change and pallor.   Neurological: Negative for syncope and speech difficulty.   Psychiatric/Behavioral: Negative for agitation and self-injury.     Objective:     /82 (BP Location: Left arm, Patient Position: Sitting, Cuff Size: Adult)   Pulse 102   Ht 167.6 cm (66\")   Wt (!) 148 kg (326 lb 8 oz)   SpO2 99%   BMI 52.70 kg/m²     General:  alert, appears stated age and cooperative   Oropharynx: lips, mucosa, and tongue normal; teeth and gums normal    Eyes:  conjunctivae/corneas clear. PERRL, EOM's intact. Fundi benign.    Ears:  normal TM's and external ear canals both ears "   Neck: no adenopathy, no carotid bruit, no JVD, supple, symmetrical, trachea midline and thyroid not enlarged, symmetric, no tenderness/mass/nodules   Thyroid:  no palpable nodule   Lung: clear to auscultation bilaterally   Heart:  regular rate and rhythm, S1, S2 normal, no murmur, click, rub or gallop   Abdomen: soft, non-tender; bowel sounds normal; no masses,  no organomegaly   Extremities: extremities normal, atraumatic, no cyanosis or edema   Skin: warm and dry, no hyperpigmentation, vitiligo, or suspicious lesions   Pulses: 2+ and symmetric   Neuro: normal without focal findings, mental status, speech normal, alert and oriented x3     Lab on 01/04/2018   Component Date Value Ref Range Status   • Amphetamine, Urine Qual 01/06/2018 Negative  Rncqhd=2981 ng/mL Final   • Barbiturates Screen, Urine 01/06/2018 Negative  Wcrjhq=431 ng/mL Final   • Benzodiazepine Screen, Urine 01/06/2018 Negative  Qkhjnw=072 ng/mL Final   • THC Screen, Urine 01/06/2018 Negative  Cutoff=20 ng/mL Final   • Cocaine Screen, Urine 01/06/2018 Negative  Lkwlse=941 ng/mL Final   • Opiate Screen, Urine 01/06/2018 Negative  Cjrnew=461 ng/mL Final   • Oxycodone/Oxymorphone, Urine 01/06/2018 Negative  Emuonm=511 ng/mL Final   • Phencyclidine (PCP), Urine 01/06/2018 Negative  Cutoff=25 ng/mL Final   • Methadone Screen, Urine 01/06/2018 Negative  Asqfhx=392 ng/mL Final   • Propoxyphene Screen 01/06/2018 Negative  Xpdygm=361 ng/mL Final   • Meperidine, Urine 01/06/2018 Negative  Uhnxtd=506 ng/mL Final   • Fentanyl, Urine 01/06/2018 Negative  Zrytkn=3615 pg/mL Final   • Tramadol Screen, Urine 01/06/2018 Negative  Ahokwv=963 ng/mL Final   • Creatinine, Urine 01/06/2018 40.1  20.0 - 300.0 mg/dL Final   • Specific Gravity, UA 01/06/2018 1.014   Final   • pH, UA 01/06/2018 6.1  4.5 - 8.9 Final   • Please note 01/06/2018 Comment   Final   Lab on 09/21/2017   Component Date Value Ref Range Status   • Total Cholesterol 09/21/2017 159  150 - 200 mg/dL Final    • Triglycerides 09/21/2017 58  35 - 160 mg/dL Final   • HDL Cholesterol 09/21/2017 37  35 - 100 mg/dL Final   • LDL Cholesterol  09/21/2017 110  mg/dL Final   • VLDL Cholesterol 09/21/2017 11.6  mg/dL Final   • LDL/HDL Ratio 09/21/2017 2.98   Final   • TSH 09/21/2017 3.360  0.460 - 4.680 mIU/mL Final   • T4, Total 09/21/2017 10.00  5.50 - 11.00 mcg/dL Final   • Glucose 09/21/2017 110* 70 - 100 mg/dL Final   • BUN 09/21/2017 18  8 - 25 mg/dL Final   • Creatinine 09/21/2017 0.70  0.40 - 1.30 mg/dL Final   • Sodium 09/21/2017 140  134 - 146 mmol/L Final   • Potassium 09/21/2017 4.4  3.4 - 5.4 mmol/L Final   • Chloride 09/21/2017 104  100 - 112 mmol/L Final   • CO2 09/21/2017 24.0  20.0 - 32.0 mmol/L Final   • Calcium 09/21/2017 9.3  8.4 - 10.8 mg/dL Final   • Total Protein 09/21/2017 7.4  6.7 - 8.2 g/dL Final   • Albumin 09/21/2017 3.80  3.20 - 5.50 g/dL Final   • ALT (SGPT) 09/21/2017 23  10 - 60 U/L Final   • AST (SGOT) 09/21/2017 23  10 - 60 U/L Final   • Alkaline Phosphatase 09/21/2017 80  15 - 121 U/L Final   • Total Bilirubin 09/21/2017 0.6  0.2 - 1.0 mg/dL Final   • eGFR Non African Amer 09/21/2017 88  51 - 120 mL/min/1.73 Final   • Globulin 09/21/2017 3.6  2.5 - 4.6 gm/dL Final   • A/G Ratio 09/21/2017 1.1  1.0 - 3.0 g/dL Final   • BUN/Creatinine Ratio 09/21/2017 25.7* 7.0 - 25.0 Final   • Anion Gap 09/21/2017 12.0  5.0 - 15.0 mmol/L Final   • WBC 09/21/2017 13.04* 3.20 - 9.80 10*3/mm3 Final   • RBC 09/21/2017 4.32  3.77 - 5.16 10*6/mm3 Final   • Hemoglobin 09/21/2017 13.6  12.0 - 15.5 g/dL Final   • Hematocrit 09/21/2017 42.5  35.0 - 45.0 % Final   • MCV 09/21/2017 98.4* 80.0 - 98.0 fL Final   • MCH 09/21/2017 31.5  26.5 - 34.0 pg Final   • MCHC 09/21/2017 32.0  31.4 - 36.0 g/dL Final   • RDW 09/21/2017 13.2  11.5 - 14.5 % Final   • RDW-SD 09/21/2017 46.8* 36.4 - 46.3 fl Final   • MPV 09/21/2017 12.5* 8.0 - 12.0 fL Final   • Platelets 09/21/2017 296  150 - 450 10*3/mm3 Final   • Neutrophil % 09/21/2017 65.9   37.0 - 80.0 % Final   • Lymphocyte % 09/21/2017 24.2  10.0 - 50.0 % Final   • Monocyte % 09/21/2017 8.0  0.0 - 12.0 % Final   • Eosinophil % 09/21/2017 1.7  0.0 - 7.0 % Final   • Basophil % 09/21/2017 0.2  0.0 - 2.0 % Final   • Neutrophils, Absolute 09/21/2017 8.59  2.00 - 8.60 10*3/mm3 Final   • Lymphocytes, Absolute 09/21/2017 3.16  0.60 - 4.20 10*3/mm3 Final   • Monocytes, Absolute 09/21/2017 1.04* 0.00 - 0.90 10*3/mm3 Final   • Eosinophils, Absolute 09/21/2017 0.22  0.00 - 0.70 10*3/mm3 Final   • Basophils, Absolute 09/21/2017 0.03  0.00 - 0.20 10*3/mm3 Final            Assessment/Plan:     Stella was seen today for hypertension.    Diagnoses and all orders for this visit:    Essential hypertension  -     CBC w AUTO Differential; Future  -     Comprehensive metabolic panel; Future    Tobacco dependence  -     varenicline (CHANTIX STARTING MONTH PAK) 0.5 MG X 11 & 1 MG X 42 tablet; Take 0.5 mg one daily on days 1-3 and and 0.5 mg twice daily on days 4-7.Then 1 mg twice daily for a total of 12 weeks.  -     Full Pulmonary Function Test With Bronchodilator; Future  - Tobacco dependence:  Counseled, 3-10 minutes spent, asymptomatic.     Need for hepatitis C screening test  -     Hepatitis C antibody; Future    Hyperglycemia  -     Hemoglobin A1c; Future    Chronic pain syndrome        - Will check Cr, if okay will prescribe NSAID and lidocaine patch.  She already has Pain Mgmt appt 6/22 ([facility])    Chronic obstructive pulmonary disease, unspecified COPD type  -     Full Pulmonary Function Test With Bronchodilator; Future    Screening for colon cancer  -     Ambulatory Referral For Screening Colonoscopy      Return in about 1 month (around 6/2/2018) for Recheck for HTN.    Goals     • Less pain            Barriers:  Morbid obesity, severe OA            Preventative:  Declined Pneumovax.  States he had Tdap in 2016. Hep C with other labs.  Already referred for Pap and mammogram.  Will refer for  colonoscopy.    Patient's Body mass index is 52.7 kg/m². BMI is above normal parameters. Follow-up plan includes:  exercise counseling and nutrition counseling.      RISK SCORE: 4          This document has been electronically signed by Alexandro Gruber MD on May 5, 2018 5:56 PM

## 2018-05-14 ENCOUNTER — TELEPHONE (OUTPATIENT)
Dept: FAMILY MEDICINE CLINIC | Facility: CLINIC | Age: 53
End: 2018-05-14

## 2018-05-14 NOTE — TELEPHONE ENCOUNTER
Returned call, clarified Rx.        This document has been electronically signed by Alexandro Gruber MD on May 15, 2018 4:46 PM      ------------    PLEASE CALL [facility] TO CLARIFY MEDICATION DOSAGE OF CHANTIX  [phone]  REF # [id number]    THANK YOU

## 2021-09-21 ENCOUNTER — APPOINTMENT (OUTPATIENT)
Dept: GENERAL RADIOLOGY | Facility: HOSPITAL | Age: 56
End: 2021-09-21

## 2021-09-21 ENCOUNTER — HOSPITAL ENCOUNTER (EMERGENCY)
Facility: HOSPITAL | Age: 56
Discharge: LEFT BEFORE DISCHARGE | End: 2021-09-21
Attending: EMERGENCY MEDICINE | Admitting: EMERGENCY MEDICINE

## 2021-09-21 VITALS
OXYGEN SATURATION: 99 % | HEIGHT: 66 IN | SYSTOLIC BLOOD PRESSURE: 140 MMHG | WEIGHT: 293 LBS | BODY MASS INDEX: 47.09 KG/M2 | TEMPERATURE: 96.8 F | DIASTOLIC BLOOD PRESSURE: 87 MMHG | HEART RATE: 100 BPM | RESPIRATION RATE: 20 BRPM

## 2021-09-21 DIAGNOSIS — A41.9 SEPSIS, UNSPECIFIED: Primary | ICD-10-CM

## 2021-09-21 DIAGNOSIS — L03.115 BILATERAL CELLULITIS OF LOWER LEG: ICD-10-CM

## 2021-09-21 DIAGNOSIS — E87.5 HYPERKALEMIA: ICD-10-CM

## 2021-09-21 DIAGNOSIS — L03.116 BILATERAL CELLULITIS OF LOWER LEG: ICD-10-CM

## 2021-09-21 DIAGNOSIS — N17.9 AKI (ACUTE KIDNEY INJURY) (HCC): ICD-10-CM

## 2021-09-21 DIAGNOSIS — J18.9 PNEUMONIA OF RIGHT LOWER LOBE DUE TO INFECTIOUS ORGANISM: ICD-10-CM

## 2021-09-21 LAB
ALBUMIN SERPL-MCNC: 3 G/DL (ref 3.5–5.2)
ALBUMIN/GLOB SERPL: 0.6 G/DL
ALP SERPL-CCNC: 188 U/L (ref 39–117)
ALT SERPL W P-5'-P-CCNC: 18 U/L (ref 1–33)
ANION GAP SERPL CALCULATED.3IONS-SCNC: 13 MMOL/L (ref 5–15)
APTT PPP: 73.1 SECONDS (ref 20–40.3)
ARTERIAL PATENCY WRIST A: ABNORMAL
AST SERPL-CCNC: 21 U/L (ref 1–32)
ATMOSPHERIC PRESS: 747 MMHG
BASE EXCESS BLDA CALC-SCNC: -2.3 MMOL/L (ref 0–2)
BASOPHILS # BLD AUTO: 0.05 10*3/MM3 (ref 0–0.2)
BASOPHILS NFR BLD AUTO: 0.1 % (ref 0–1.5)
BDY SITE: ABNORMAL
BILIRUB SERPL-MCNC: 0.4 MG/DL (ref 0–1.2)
BUN SERPL-MCNC: 84 MG/DL (ref 6–20)
BUN/CREAT SERPL: 30.3 (ref 7–25)
CALCIUM SPEC-SCNC: 9.7 MG/DL (ref 8.6–10.5)
CHLORIDE SERPL-SCNC: 83 MMOL/L (ref 98–107)
CO2 SERPL-SCNC: 24 MMOL/L (ref 22–29)
CREAT SERPL-MCNC: 2.77 MG/DL (ref 0.57–1)
CRP SERPL-MCNC: 43.84 MG/DL (ref 0–0.5)
D-LACTATE SERPL-SCNC: 2.6 MMOL/L (ref 0.5–2)
DEPRECATED RDW RBC AUTO: 50.1 FL (ref 37–54)
EOSINOPHIL # BLD AUTO: 0.28 10*3/MM3 (ref 0–0.4)
EOSINOPHIL # BLD MANUAL: 0.68 10*3/MM3 (ref 0–0.4)
EOSINOPHIL NFR BLD AUTO: 0.8 % (ref 0.3–6.2)
EOSINOPHIL NFR BLD MANUAL: 2 % (ref 0.3–6.2)
ERYTHROCYTE [DISTWIDTH] IN BLOOD BY AUTOMATED COUNT: 14.8 % (ref 12.3–15.4)
GFR SERPL CREATININE-BSD FRML MDRD: 18 ML/MIN/1.73
GLOBULIN UR ELPH-MCNC: 4.9 GM/DL
GLUCOSE SERPL-MCNC: 87 MG/DL (ref 65–99)
HCO3 BLDA-SCNC: 23.5 MMOL/L (ref 20–26)
HCT VFR BLD AUTO: 40 % (ref 34–46.6)
HGB BLD-MCNC: 13.3 G/DL (ref 12–15.9)
HOLD SPECIMEN: NORMAL
HOLD SPECIMEN: NORMAL
IMM GRANULOCYTES # BLD AUTO: 1.25 10*3/MM3 (ref 0–0.05)
IMM GRANULOCYTES NFR BLD AUTO: 3.7 % (ref 0–0.5)
INR PPP: 1.17 (ref 0.8–1.2)
LYMPHOCYTES # BLD AUTO: 1.59 10*3/MM3 (ref 0.7–3.1)
LYMPHOCYTES # BLD MANUAL: 1.35 10*3/MM3 (ref 0.7–3.1)
LYMPHOCYTES NFR BLD AUTO: 4.7 % (ref 19.6–45.3)
LYMPHOCYTES NFR BLD MANUAL: 2 % (ref 5–12)
LYMPHOCYTES NFR BLD MANUAL: 4 % (ref 19.6–45.3)
Lab: ABNORMAL
MAGNESIUM SERPL-MCNC: 2.6 MG/DL (ref 1.6–2.6)
MCH RBC QN AUTO: 32.1 PG (ref 26.6–33)
MCHC RBC AUTO-ENTMCNC: 33.3 G/DL (ref 31.5–35.7)
MCV RBC AUTO: 96.6 FL (ref 79–97)
METAMYELOCYTES NFR BLD MANUAL: 2 % (ref 0–0)
MODALITY: ABNORMAL
MONOCYTES # BLD AUTO: 0.68 10*3/MM3 (ref 0.1–0.9)
MONOCYTES # BLD AUTO: 1.23 10*3/MM3 (ref 0.1–0.9)
MONOCYTES NFR BLD AUTO: 3.6 % (ref 5–12)
NEUTROPHILS # BLD AUTO: 30.43 10*3/MM3 (ref 1.7–7)
NEUTROPHILS NFR BLD AUTO: 29.41 10*3/MM3 (ref 1.7–7)
NEUTROPHILS NFR BLD AUTO: 87.1 % (ref 42.7–76)
NEUTROPHILS NFR BLD MANUAL: 76 % (ref 42.7–76)
NEUTS BAND NFR BLD MANUAL: 14 % (ref 0–5)
NRBC BLD AUTO-RTO: 0.1 /100 WBC (ref 0–0.2)
PCO2 BLDA: 42.9 MM HG (ref 35–45)
PH BLDA: 7.35 PH UNITS (ref 7.35–7.45)
PHOSPHATE SERPL-MCNC: 5.4 MG/DL (ref 2.5–4.5)
PLAT MORPH BLD: NORMAL
PLATELET # BLD AUTO: 328 10*3/MM3 (ref 140–450)
PMV BLD AUTO: 11.7 FL (ref 6–12)
PO2 BLDA: 68.3 MM HG (ref 83–108)
POTASSIUM SERPL-SCNC: 6.8 MMOL/L (ref 3.5–5.2)
PROT SERPL-MCNC: 7.9 G/DL (ref 6–8.5)
PROTHROMBIN TIME: 14.8 SECONDS (ref 11.1–15.3)
RBC # BLD AUTO: 4.14 10*6/MM3 (ref 3.77–5.28)
RBC MORPH BLD: NORMAL
SAO2 % BLDCOA: 92.9 % (ref 94–99)
SODIUM SERPL-SCNC: 120 MMOL/L (ref 136–145)
VENTILATOR MODE: ABNORMAL
WBC # BLD AUTO: 33.81 10*3/MM3 (ref 3.4–10.8)
WBC MORPH BLD: NORMAL
WHOLE BLOOD HOLD SPECIMEN: NORMAL
WHOLE BLOOD HOLD SPECIMEN: NORMAL

## 2021-09-21 PROCEDURE — 83735 ASSAY OF MAGNESIUM: CPT

## 2021-09-21 PROCEDURE — 93005 ELECTROCARDIOGRAM TRACING: CPT | Performed by: EMERGENCY MEDICINE

## 2021-09-21 PROCEDURE — 93010 ELECTROCARDIOGRAM REPORT: CPT | Performed by: INTERNAL MEDICINE

## 2021-09-21 PROCEDURE — 93005 ELECTROCARDIOGRAM TRACING: CPT

## 2021-09-21 PROCEDURE — 85025 COMPLETE CBC W/AUTO DIFF WBC: CPT

## 2021-09-21 PROCEDURE — 82803 BLOOD GASES ANY COMBINATION: CPT

## 2021-09-21 PROCEDURE — 84100 ASSAY OF PHOSPHORUS: CPT

## 2021-09-21 PROCEDURE — 36600 WITHDRAWAL OF ARTERIAL BLOOD: CPT

## 2021-09-21 PROCEDURE — 87040 BLOOD CULTURE FOR BACTERIA: CPT

## 2021-09-21 PROCEDURE — 99283 EMERGENCY DEPT VISIT LOW MDM: CPT

## 2021-09-21 PROCEDURE — 36415 COLL VENOUS BLD VENIPUNCTURE: CPT

## 2021-09-21 PROCEDURE — 85610 PROTHROMBIN TIME: CPT

## 2021-09-21 PROCEDURE — 71045 X-RAY EXAM CHEST 1 VIEW: CPT

## 2021-09-21 PROCEDURE — 85730 THROMBOPLASTIN TIME PARTIAL: CPT

## 2021-09-21 PROCEDURE — 86140 C-REACTIVE PROTEIN: CPT

## 2021-09-21 PROCEDURE — 80053 COMPREHEN METABOLIC PANEL: CPT

## 2021-09-21 PROCEDURE — 83605 ASSAY OF LACTIC ACID: CPT

## 2021-09-21 PROCEDURE — 85007 BL SMEAR W/DIFF WBC COUNT: CPT | Performed by: EMERGENCY MEDICINE

## 2021-09-21 RX ORDER — DEXTROSE MONOHYDRATE 25 G/50ML
50 INJECTION, SOLUTION INTRAVENOUS
Status: DISCONTINUED | OUTPATIENT
Start: 2021-09-21 | End: 2021-09-21

## 2021-09-21 RX ORDER — CALCIUM GLUCONATE 94 MG/ML
1 INJECTION, SOLUTION INTRAVENOUS ONCE
Status: DISCONTINUED | OUTPATIENT
Start: 2021-09-21 | End: 2021-09-21

## 2021-09-21 RX ORDER — SODIUM POLYSTYRENE SULFONATE 15 G/60ML
15 SUSPENSION ORAL; RECTAL ONCE
Status: COMPLETED | OUTPATIENT
Start: 2021-09-21 | End: 2021-09-21

## 2021-09-21 RX ORDER — SODIUM CHLORIDE 0.9 % (FLUSH) 0.9 %
10 SYRINGE (ML) INJECTION AS NEEDED
Status: DISCONTINUED | OUTPATIENT
Start: 2021-09-21 | End: 2021-09-21

## 2021-09-21 RX ADMIN — SODIUM POLYSTYRENE SULFONATE 15 G: 15 SUSPENSION ORAL; RECTAL at 18:50

## 2021-09-21 NOTE — ED NOTES
"Patients  upset that we are not allowing visitors upstairs when patients are admitted.  This RN attempted to explain that it is hospital policy and that I understand his frustration.  Patients  very loud and irritated at this time.  APRN Star made aware that pt wants to leave AMA and \"Go to [place] where they actually care about patients and their visitors\".    APRN Star at patients bedside explaining that patient has critical lab values and could die if leaving AMA.  Patients  states that he understands.      Amaya Arguello RN  09/21/21 1834       Amaya Arguello RN  09/21/21 1835    "

## 2021-09-21 NOTE — ED NOTES
This RN called lab to collect 2nd set of cultures and a rainbow after X3 failed attempts.      Amaya Arguello, MEGAN  09/21/21 9539

## 2021-09-22 NOTE — ED PROVIDER NOTES
"Subjective   Patient presents to the ER with c/o worsening infection to bilateral lower legs. Patient is morbidly obese and has chronic lymphadenopathy to bilateral legs and a history of venous statis ulcers. She was dx hospitalized several several weeks ago at [facility] for recurrent cellulitis to bilateral legs, EL, and hyperkalemia.  states she patient had to have one round of dialysis and her kidneys have been improving. Patient has been being seen by home health and has been taking antibiotics as prescribed, but he states her legs are getting worse. He states today patient has started \"talking out of her head\". Patient is suppose to be going to wound care but  is not clear as to why this has not started or plans for when this will start. Patient states her legs just hurt and her pain medication at home (Methodone) does not help. Information from patient is limited as the  interrupts the patient when she tries to answer questions and answers them for her.          Review of Systems   Constitutional: Positive for activity change and fatigue. Negative for chills and fever.   HENT: Negative.    Respiratory: Positive for cough and shortness of breath.    Cardiovascular: Negative for chest pain and palpitations.   Gastrointestinal: Negative for abdominal pain, nausea and vomiting.   Musculoskeletal: Positive for gait problem.        Leg pain   Skin: Positive for wound.   Neurological: Positive for weakness (generalized). Negative for dizziness, light-headedness and headaches.   Psychiatric/Behavioral: Negative.        Past Medical History:   Diagnosis Date   • Allergic rhinitis    • COPD (chronic obstructive pulmonary disease) (CMS/[facility])    • Degenerative joint disease involving multiple joints    • Discoloration of skin    • Edema of lower extremity    • Essential hypertension    • Hyperlipidemia    • Morbid obesity due to excess calories (CMS/[facility])    • Opioid dependence (CMS/HCC)     on " "methadone      • Pain in unspecified hand    • Pain in unspecified knee    • Pitting edema 3+        Allergies   Allergen Reactions   • Bactrim [Sulfamethoxazole-Trimethoprim] Hives   • Ciprofloxacin Other (See Comments)     Medication interaction   • Zocor [Simvastatin]    • Latex Rash       Past Surgical History:   Procedure Laterality Date   • NO PAST SURGERIES         Family History   Problem Relation Age of Onset   • Diabetes Other    • Hyperlipidemia Other    • Hypertension Other    • Heart disease Other    • Hypertension Mother    • Hypertension Father    • Hypertension Maternal Aunt    • Thyroid disease Maternal Aunt        Social History     Socioeconomic History   • Marital status:      Spouse name: Not on file   • Number of children: Not on file   • Years of education: Not on file   • Highest education level: Not on file   Tobacco Use   • Smoking status: Current Every Day Smoker     Packs/day: 1.00     Years: 36.00     Pack years: 36.00     Types: Cigarettes     Start date: 1981   • Smokeless tobacco: Never Used   Substance and Sexual Activity   • Alcohol use: No   • Drug use: No   • Sexual activity: Defer           Objective    /87 (BP Location: Right arm, Patient Position: Sitting)   Pulse 100   Temp 96.8 °F (36 °C) (Infrared)   Resp 20   Ht 167.6 cm (66\")   Wt (!) 183 kg (404 lb)   SpO2 99%   BMI 65.21 kg/m²   Physical Exam  Vitals and nursing note reviewed.   Constitutional:       General: She is not in acute distress.     Appearance: She is well-developed. She is ill-appearing.      Comments: Morbidly obese, disheveled appearing, poor hygiene   HENT:      Head: Normocephalic and atraumatic.   Cardiovascular:      Rate and Rhythm: Normal rate and regular rhythm.      Heart sounds: Normal heart sounds. No murmur heard.     Pulmonary:      Effort: Pulmonary effort is normal. No respiratory distress.      Breath sounds: Examination of the right-upper field reveals wheezing. " Examination of the left-upper field reveals wheezing. Examination of the right-lower field reveals decreased breath sounds. Examination of the left-lower field reveals decreased breath sounds. Decreased breath sounds and wheezing present.   Abdominal:      Palpations: Abdomen is soft.      Tenderness: There is no abdominal tenderness.   Musculoskeletal:      Comments: ROM normal for patient given body habitus   Skin:     Capillary Refill: Capillary refill takes less than 2 seconds.      Comments: Bilateral lower legs with redness, swelling, multiple large open ulcerative legions that is weaping clear fluid with large amount of thick white discharge noted throughout from below knees and includes feet. Skin cracking to feet.    Neurological:      Mental Status: She is alert and oriented to person, place, and time.      Comments: Patient is alert and oriented x 3 - patient does not show any signs of confusion and is felt to be able to make medical decisions at this time.    Psychiatric:         Behavior: Behavior normal.         Thought Content: Thought content normal.         Judgment: Judgment normal.         Procedures  Results for orders placed or performed during the hospital encounter of 09/21/21   Comprehensive Metabolic Panel    Specimen: Blood   Result Value Ref Range    Glucose 87 65 - 99 mg/dL    BUN 84 (H) 6 - 20 mg/dL    Creatinine 2.77 (H) 0.57 - 1.00 mg/dL    Sodium 120 (L) 136 - 145 mmol/L    Potassium 6.8 (C) 3.5 - 5.2 mmol/L    Chloride 83 (L) 98 - 107 mmol/L    CO2 24.0 22.0 - 29.0 mmol/L    Calcium 9.7 8.6 - 10.5 mg/dL    Total Protein 7.9 6.0 - 8.5 g/dL    Albumin 3.00 (L) 3.50 - 5.20 g/dL    ALT (SGPT) 18 1 - 33 U/L    AST (SGOT) 21 1 - 32 U/L    Alkaline Phosphatase 188 (H) 39 - 117 U/L    Total Bilirubin 0.4 0.0 - 1.2 mg/dL    eGFR Non African Amer 18 (L) >60 mL/min/1.73    Globulin 4.9 gm/dL    A/G Ratio 0.6 g/dL    BUN/Creatinine Ratio 30.3 (H) 7.0 - 25.0    Anion Gap 13.0 5.0 - 15.0 mmol/L    Protime-INR    Specimen: Blood   Result Value Ref Range    Protime 14.8 11.1 - 15.3 Seconds    INR 1.17 0.80 - 1.20   aPTT    Specimen: Blood   Result Value Ref Range    PTT 73.1 (H) 20.0 - 40.3 seconds   Magnesium    Specimen: Blood   Result Value Ref Range    Magnesium 2.6 1.6 - 2.6 mg/dL   Phosphorus    Specimen: Blood   Result Value Ref Range    Phosphorus 5.4 (H) 2.5 - 4.5 mg/dL   Lactic Acid, Plasma    Specimen: Blood   Result Value Ref Range    Lactate 2.6 (C) 0.5 - 2.0 mmol/L   C-reactive Protein    Specimen: Blood   Result Value Ref Range    C-Reactive Protein 43.84 (H) 0.00 - 0.50 mg/dL   CBC Auto Differential    Specimen: Blood   Result Value Ref Range    WBC 33.81 (C) 3.40 - 10.80 10*3/mm3    RBC 4.14 3.77 - 5.28 10*6/mm3    Hemoglobin 13.3 12.0 - 15.9 g/dL    Hematocrit 40.0 34.0 - 46.6 %    MCV 96.6 79.0 - 97.0 fL    MCH 32.1 26.6 - 33.0 pg    MCHC 33.3 31.5 - 35.7 g/dL    RDW 14.8 12.3 - 15.4 %    RDW-SD 50.1 37.0 - 54.0 fl    MPV 11.7 6.0 - 12.0 fL    Platelets 328 140 - 450 10*3/mm3    Neutrophil % 87.1 (H) 42.7 - 76.0 %    Lymphocyte % 4.7 (L) 19.6 - 45.3 %    Monocyte % 3.6 (L) 5.0 - 12.0 %    Eosinophil % 0.8 0.3 - 6.2 %    Basophil % 0.1 0.0 - 1.5 %    Immature Grans % 3.7 (H) 0.0 - 0.5 %    Neutrophils, Absolute 29.41 (H) 1.70 - 7.00 10*3/mm3    Lymphocytes, Absolute 1.59 0.70 - 3.10 10*3/mm3    Monocytes, Absolute 1.23 (H) 0.10 - 0.90 10*3/mm3    Eosinophils, Absolute 0.28 0.00 - 0.40 10*3/mm3    Basophils, Absolute 0.05 0.00 - 0.20 10*3/mm3    Immature Grans, Absolute 1.25 (H) 0.00 - 0.05 10*3/mm3    nRBC 0.1 0.0 - 0.2 /100 WBC   Manual Differential    Specimen: Blood   Result Value Ref Range    Neutrophil % 76.0 42.7 - 76.0 %    Lymphocyte % 4.0 (L) 19.6 - 45.3 %    Monocyte % 2.0 (L) 5.0 - 12.0 %    Eosinophil % 2.0 0.3 - 6.2 %    Bands %  14.0 (H) 0.0 - 5.0 %    Metamyelocyte % 2.0 (H) 0.0 - 0.0 %    Neutrophils Absolute 30.43 (H) 1.70 - 7.00 10*3/mm3    Lymphocytes Absolute 1.35 0.70 -  3.10 10*3/mm3    Monocytes Absolute 0.68 0.10 - 0.90 10*3/mm3    Eosinophils Absolute 0.68 (H) 0.00 - 0.40 10*3/mm3    RBC Morphology Normal Normal    WBC Morphology Normal Normal    Platelet Morphology Normal Normal   Blood Gas, Arterial -    Specimen: Arterial Blood   Result Value Ref Range    Site Right Brachial     Ryan's Test N/A     pH, Arterial 7.346 (L) 7.350 - 7.450 pH units    pCO2, Arterial 42.9 35.0 - 45.0 mm Hg    pO2, Arterial 68.3 (L) 83.0 - 108.0 mm Hg    HCO3, Arterial 23.5 20.0 - 26.0 mmol/L    Base Excess, Arterial -2.3 (L) 0.0 - 2.0 mmol/L    O2 Saturation, Arterial 92.9 (L) 94.0 - 99.0 %    Barometric Pressure for Blood Gas 747 mmHg    Modality Room Air     Ventilator Mode NA     Collected by RT    Green Top (Gel)   Result Value Ref Range    Extra Tube Hold for add-ons.    Lavender Top   Result Value Ref Range    Extra Tube hold for add-on    Gold Top - SST   Result Value Ref Range    Extra Tube Hold for add-ons.    Light Blue Top   Result Value Ref Range    Extra Tube hold for add-on      XR Chest 1 View    Result Date: 9/21/2021  Narrative: EXAM: XR CHEST 1 VIEW COMPARISONS: Radiograph 4/1/2016 INDICATION: Severe Sepsis triage protocol FINDINGS: Frontal view of the chest. Appearance of mild bilateral lower lobe airspace opacities, left greater than right. No effusion or pneumothorax. Heart size is normal. No acute osseous abnormality.     Impression: Left greater than right lower lobe airspace disease/pneumonia. Electronically signed by:  Leigha Sebastian MD  9/21/2021 5:06 PM CDT Workstation: [id number]             ED Course  ED Course as of Sep 22 1054   Tue Sep 21, 2021   1819 Dr. Beltran paged.    [SH]   1820 Spoke with Dr. Beltran who agrees to admission.     [SH]   1833 Family states they do not want to stay because we have a facility COVID no visitor policy at this time. States he is going to take her Den. Discussed with patient and patient's  risk of leaving AMA  with patient meeting sepsis criteria, EL, and hyperkalemia. Advised condition could worsen and she could die. Both  and patient verbalize understanding. Agrees to Kayexalate prior to leaving AMA.     [SH]      ED Course User Index  [SH] Karen Moreno, MURPHY                                           [facility]    Final diagnoses:   Sepsis, unspecified   Bilateral cellulitis of lower leg   EL (acute kidney injury) (HCC)   Hyperkalemia   Pneumonia of right lower lobe due to infectious organism       ED Disposition  ED Disposition     ED Disposition Condition Comment    AMA            No follow-up provider specified.       Medication List      No changes were made to your prescriptions during this visit.          Karen Moreno, MURPHY  09/22/21 7461

## 2021-09-23 ENCOUNTER — APPOINTMENT (OUTPATIENT)
Dept: WOUND CARE | Facility: HOSPITAL | Age: 56
End: 2021-09-23

## 2021-09-25 LAB
QT INTERVAL: 338 MS
QTC INTERVAL: 427 MS

## 2021-09-26 LAB
BACTERIA SPEC AEROBE CULT: NORMAL
BACTERIA SPEC AEROBE CULT: NORMAL

## 2021-11-26 ENCOUNTER — HOME HEALTH ADMISSION (OUTPATIENT)
Dept: HOME HEALTH SERVICES | Facility: HOME HEALTHCARE | Age: 56
End: 2021-11-26